# Patient Record
(demographics unavailable — no encounter records)

---

## 2024-11-14 NOTE — REVIEW OF SYSTEMS
[Nl] : no feeding issues at this time. [Solid Foods] : Eating solid foods. [___ Formula] : [unfilled] Formula  [___ ounces/feeding] : ~PANCHITO ramirez/feeding [___ Times/day] : [unfilled] times/day [Acting Fussy] : not acting ~L fussy [Fever] : no fever [Wgt Loss (___ Lbs)] : no recent weight loss [Pallor] : not pale [Discharge] : no discharge [Redness] : no redness [Nasal Discharge] : no nasal discharge [Nasal Stuffiness] : no nasal congestion [Stridor] : no stridor [Cyanosis] : no cyanosis [Edema] : no edema [Diaphoresis] : not diaphoretic [Tachypnea] : not tachypneic [Wheezing] : no wheezing [Cough] : no cough [Being A Poor Eater] : not a poor eater [Vomiting] : no vomiting [Diarrhea] : no diarrhea [Decrease In Appetite] : appetite not decreased [Fainting (Syncope)] : no fainting [Dec Consciousness] :  no decrease in consciousness [Seizure] : no seizures [Hypotonicity (Flaccid)] : not hypotonic [Refusal to Bear Wgt] : normal weight bearing [Puffy Hands/Feet] : no hand/feet puffiness [Rash] : no rash [Hemangioma] : no hemangioma [Jaundice] : no jaundice [Wound problems] : no wound problems [Bruising] : no tendency for easy bruising [Swollen Glands] : no lymphadenopathy [Enlarged [place]] : the fontanelle was not enlarged [Hoarse Cry] : no hoarse cry [Failure To Thrive] : no failure to thrive [Vaginal Discharge] : no vaginal discharge [Ambiguous Genitals] : genitals not ambiguous [Dec Urine Output] : no oliguria

## 2024-11-14 NOTE — CONSULT LETTER
[Today's Date] : [unfilled] [Name] : Name: [unfilled] [] : : ~~ [Today's Date:] : [unfilled] [Dear  ___:] : Dear Dr. [unfilled]: [Consult] : I had the pleasure of evaluating your patient, [unfilled]. My full evaluation follows. [Consult - Single Provider] : Thank you very much for allowing me to participate in the care of this patient. If you have any questions, please do not hesitate to contact me. [Sincerely,] : Sincerely, [FreeTextEntry4] : Kaiser Trujillo MD [FreeTextEntry5] : [address] [place] [address] + 1 [FreeTextEntry6] : [phone]

## 2024-11-14 NOTE — REVIEW OF SYSTEMS
[Nl] : no feeding issues at this time. [Solid Foods] : Eating solid foods. [___ Formula] : [unfilled] Formula  [___ ounces/feeding] : ~PANCHITO ramirez/feeding [___ Times/day] : [unfilled] times/day [Acting Fussy] : not acting ~L fussy [Fever] : no fever [Wgt Loss (___ Lbs)] : no recent weight loss [Pallor] : not pale [Discharge] : no discharge [Redness] : no redness [Nasal Discharge] : no nasal discharge [Nasal Stuffiness] : no nasal congestion [Stridor] : no stridor [Cyanosis] : no cyanosis [Edema] : no edema [Diaphoresis] : not diaphoretic [Tachypnea] : not tachypneic [Wheezing] : no wheezing [Cough] : no cough [Being A Poor Eater] : not a poor eater [Vomiting] : no vomiting [Diarrhea] : no diarrhea [Decrease In Appetite] : appetite not decreased [Fainting (Syncope)] : no fainting [Dec Consciousness] :  no decrease in consciousness [Seizure] : no seizures [Hypotonicity (Flaccid)] : not hypotonic [Refusal to Bear Wgt] : normal weight bearing [Puffy Hands/Feet] : no hand/feet puffiness [Rash] : no rash [Hemangioma] : no hemangioma [Jaundice] : no jaundice [Wound problems] : no wound problems [Bruising] : no tendency for easy bruising [Swollen Glands] : no lymphadenopathy [Enlarged [place]] : the fontanelle was not enlarged [Hoarse Cry] : no hoarse cry [Failure To Thrive] : no failure to thrive [Vaginal Discharge] : no vaginal discharge [Ambiguous Genitals] : genitals not ambiguous [Dec Urine Output] : no oliguria

## 2024-11-14 NOTE — CARDIOLOGY SUMMARY
[Today's Date] : [unfilled] [FreeTextEntry1] : Normal sinus rhythm.  Normal ECG. [FreeTextEntry2] : 1. Technically difficult study due to patient movement. 2. Normal pulmonary valve annulus dimension and low normal sized main pulmonary artery with mild flow acceleration (Vmax <2m/s). 3. Normal right branch pulmonary artery and normal left branch pulmonary artery. 4. Normal left ventricular size, morphology and systolic function. 5. Normal right ventricular morphology with qualitatively normal size and systolic function. 6. No patent ductus arteriosus. 7. No pericardial effusion. 8. Findings limited to these.

## 2024-11-14 NOTE — REASON FOR VISIT
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

## 2024-11-14 NOTE — CONSULT LETTER
[Today's Date] : [unfilled] [Name] : Name: [unfilled] [] : : ~~ [Today's Date:] : [unfilled] [Dear  ___:] : Dear Dr. [unfilled]: [Consult] : I had the pleasure of evaluating your patient, [unfilled]. My full evaluation follows. [Consult - Single Provider] : Thank you very much for allowing me to participate in the care of this patient. If you have any questions, please do not hesitate to contact me. [Sincerely,] : Sincerely, [FreeTextEntry4] : Kaiser Trujillo MD [FreeTextEntry5] : [address] [place] [address] + 1 [FreeTextEntry6] : [phone]

## 2025-05-08 NOTE — CARDIOLOGY SUMMARY
[Today's Date] : [unfilled] [FreeTextEntry1] : Unable to obtain ECG due to patient crying/agitation [FreeTextEntry2] : 1. Technically limited study due to patient agitation and unable to cooperate. 2. Normal pulmonary valve annulus dimension and normal main pulmonary artery dimension with no significant flow acceleration (Vmax 1.3-1.9 m/s, crying). 3. Normal main pulmonary artery confluent with the right and left branch pulmonary arteries. 4. Normal right and left branch pulmonary arteries. 5. Normal right ventricular morphology with qualitatively normal size and systolic function. 6. Normal left ventricular size, morphology and systolic function. 7. No pericardial effusion. 8. Findings limited to these.

## 2025-05-08 NOTE — CONSULT LETTER
[Today's Date] : [unfilled] [Name] : Name: [unfilled] [] : : ~~ [Today's Date:] : [unfilled] [Dear  ___:] : Dear Dr. [unfilled]: [Consult] : I had the pleasure of evaluating your patient, [unfilled]. My full evaluation follows. [Consult - Single Provider] : Thank you very much for allowing me to participate in the care of this patient. If you have any questions, please do not hesitate to contact me. [Sincerely,] : Sincerely, [FreeTextEntry4] : Kaiser Trujillo MD [FreeTextEntry5] : [address] [place] [address] + 1 [FreeTextEntry6] : [phone]